# Patient Record
Sex: FEMALE | Race: OTHER
[De-identification: names, ages, dates, MRNs, and addresses within clinical notes are randomized per-mention and may not be internally consistent; named-entity substitution may affect disease eponyms.]

---

## 2020-06-11 ENCOUNTER — HOSPITAL ENCOUNTER (OUTPATIENT)
Dept: HOSPITAL 56 - MW.ED | Age: 30
Setting detail: OBSERVATION
LOS: 1 days | Discharge: HOME | End: 2020-06-12
Attending: SURGERY | Admitting: SURGERY
Payer: COMMERCIAL

## 2020-06-11 DIAGNOSIS — D72.829: ICD-10-CM

## 2020-06-11 DIAGNOSIS — K82.8: Primary | ICD-10-CM

## 2020-06-11 DIAGNOSIS — F17.210: ICD-10-CM

## 2020-06-11 DIAGNOSIS — Z88.5: ICD-10-CM

## 2020-06-11 LAB
BUN SERPL-MCNC: 10 MG/DL (ref 7–18)
CHLORIDE SERPL-SCNC: 100 MMOL/L (ref 98–107)
CO2 SERPL-SCNC: 21.3 MMOL/L (ref 21–32)
GLUCOSE SERPL-MCNC: 100 MG/DL (ref 74–106)
LIPASE SERPL-CCNC: 155 U/L (ref 73–393)
POTASSIUM SERPL-SCNC: 3.4 MMOL/L (ref 3.5–5.1)
SODIUM SERPL-SCNC: 140 MMOL/L (ref 136–145)

## 2020-06-11 PROCEDURE — 84703 CHORIONIC GONADOTROPIN ASSAY: CPT

## 2020-06-11 PROCEDURE — 99285 EMERGENCY DEPT VISIT HI MDM: CPT

## 2020-06-11 PROCEDURE — 76705 ECHO EXAM OF ABDOMEN: CPT

## 2020-06-11 PROCEDURE — 84484 ASSAY OF TROPONIN QUANT: CPT

## 2020-06-11 PROCEDURE — G0378 HOSPITAL OBSERVATION PER HR: HCPCS

## 2020-06-11 PROCEDURE — 96375 TX/PRO/DX INJ NEW DRUG ADDON: CPT

## 2020-06-11 PROCEDURE — 85025 COMPLETE CBC W/AUTO DIFF WBC: CPT

## 2020-06-11 PROCEDURE — 83690 ASSAY OF LIPASE: CPT

## 2020-06-11 PROCEDURE — 96376 TX/PRO/DX INJ SAME DRUG ADON: CPT

## 2020-06-11 PROCEDURE — 96365 THER/PROPH/DIAG IV INF INIT: CPT

## 2020-06-11 PROCEDURE — 71045 X-RAY EXAM CHEST 1 VIEW: CPT

## 2020-06-11 PROCEDURE — 81003 URINALYSIS AUTO W/O SCOPE: CPT

## 2020-06-11 PROCEDURE — 80053 COMPREHEN METABOLIC PANEL: CPT

## 2020-06-11 PROCEDURE — 74177 CT ABD & PELVIS W/CONTRAST: CPT

## 2020-06-11 PROCEDURE — 36415 COLL VENOUS BLD VENIPUNCTURE: CPT

## 2020-06-11 PROCEDURE — 93005 ELECTROCARDIOGRAM TRACING: CPT

## 2020-06-11 NOTE — CR
Chest: Portable view of the chest was obtained.

 

Comparison: No prior chest imaging is available.

 

Heart size and mediastinum are normal.  Lungs are clear with no acute 

parenchymal change.  Bony structures are unremarkable.

 

Impression:

1.  Nothing acute is seen on portable chest x-ray.

 

Diagnostic code #1

 

This report was dictated in MDT
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
## 2020-06-11 NOTE — US
INDICATION:



Fluid around gallbladder on CT. 



COMPARISON:



CT of the abdomen and pelvis from today. 



TECHNIQUE:



Ultrasound examination of the right upper quadrant was performed. 



FINDINGS:



The gallbladder wall is mildly thickened at 5 millimeters. There is no 

definite pericholecystic fluid. 



There is sludge in the gallbladder, without calculi. 



A sonographic Toth sign is present, with pain over the gallbladder during 

ultrasound examination. 



The common bile duct is normal in caliber at 2 mm. 



The pancreatic head and body were examined, and these are normal in 

appearance. 



The abdominal aorta and visualized portions of the inferior vena cava are 

normal in appearance.



The liver shows no sign of mass or contour abnormality, and there is no 

sign of ascites.



The right kidney shows no sign of mass or hydronephrosis. 



A small right pleural effusion is seen, not evident on the CT scan. 



IMPRESSION:



Gallbladder wall mildly thickened with positive sonographic Toth sign, 

findings suggesting acute cholecystitis. 



Sludge in the gallbladder without cholelithiasis. 



No sign of biliary ductal dilatation.



Dictated by Festus Hernandez MD @ Jun 11 2020 11:39PM



Signed by Dr. Festus Hernandez @ Jun 11 2020 11:43PM

## 2020-06-11 NOTE — CT
CT abdomen and pelvis

 

Technique: Multiple axial sections were obtained from above the dome 

of the diaphragm inferiorly through the pubic symphysis.  Intravenous 

contrast was utilized.  No oral contrast was given.

 

Findings: Gallbladder is mildly dilated.  There is some possible fluid

 around the gallbladder.  No calcified gallstones are seen.

 

Findings: Visualized lung bases show nothing acute.  Liver contains no

 focal abnormality.  Spleen appears within normal limits.  Adrenal 

glands show no nodule.  Kidneys show symmetric contrast enhancement 

with no hydronephrosis or mass.  Pancreas shows no discrete 

abnormality.  Aorta shows no aneurysm.  No retroperitoneal adenopathy 

or mesenteric abnormalities are seen.  Appendix is seen which is 

normal in size.  No pelvic mass or adenopathy is seen.  No free fluid 

or inflammatory change is appreciated.

 

Bone window settings were reviewed which shows no acute osseous 

finding.

 

Impression:

1.  Gallbladder slightly distended with possible fluid around the 

gallbladder.  Please correlate if patient has any right upper quadrant

 symptoms to indicate gallbladder ultrasound.

2.  Other portions of the CT exam of the abdomen and pelvis appears 

within normal limits with nothing acute otherwise being seen.

 

Diagnostic code #3

 

This report was dictated in MDT

## 2020-06-12 LAB
BUN SERPL-MCNC: 6 MG/DL (ref 7–18)
CHLORIDE SERPL-SCNC: 106 MMOL/L (ref 98–107)
CO2 SERPL-SCNC: 25.9 MMOL/L (ref 21–32)
GLUCOSE SERPL-MCNC: 93 MG/DL (ref 74–106)
POTASSIUM SERPL-SCNC: 3.7 MMOL/L (ref 3.5–5.1)
SODIUM SERPL-SCNC: 141 MMOL/L (ref 136–145)

## 2020-06-12 NOTE — CONS
DATE OF CONSULTATION:

06/12/2020

 

YOB: 1990

 

PRIMARY CARE PHYSICIAN:

None PCP

 

Consult from Dr. Mercer in the emergency room.

 

CONSULTING QUESTION:

Possible cholecystitis.

 

HISTORY OF PRESENT ILLNESS:

The patient is a 30-year-old obese lady and complained of 48 hours of gradual

onset of epigastric pain.  The pain has radiated to her back through both the

left and the right flanks, and the patient describes the pain as similar to when

she was in labor.  She sought help in the emergency room, and a CAT scan shows

mild gallbladder wall thickening with sludge, no gallstone.  An ultrasound shows

mild wall thickening, no stone, sludge, and definitely no cholecystic fluid.

The patient's lab work shows liver function test within normal limit.  The white

count is 16.5.  Surgery was consulted for further management.  The patient

denied prior episode and denied fever, chill, or nausea and vomiting, and the

patient is complaining about pain.

 

ALLERGIES:

Please refer to nursing note for detail.

 

MEDICATIONS:

Please refer to nursing note for detail.

 

FAMILY HISTORY:

No family history of malignant hyperthermia.

 

PAST SURGICAL HISTORY:

The patient had right thumb reimplantation about 1 year ago secondary to trauma,

tubal ligation, and normal vaginal deliveries x2.

 

PHYSICAL EXAMINATION:

GENERAL:  A very pleasant lady in no acute distress, smiled to the doctor, very

cooperating, and very polite.

HEENT:  Normocephalic and atraumatic.  Sclerae were anicteric.

LUNGS:  Clear to auscultation.

HEART:  Regular rate and rhythm.

ABDOMEN:  Soft and nondistended.  No pulsating tender midline abdominal

structure.  Tenderness in the left lower quadrant, as well as epigastrium and

right upper quadrant.  No rebound tenderness.  Umbilical hernia.

SKIN:  Intact.

 

LABORATORY VALUES UPON CONSULTATION:

White count is 16.5, H and H are 15 and 44, and platelets are 308,000.  Sodium

is 140, potassium is 3.4, BUN is 10, creatinine is 1.4, and glucose is 100.

Total bilirubin is 0.2, AST and ALT of 34 and 37, and alkaline phosphatase is

111. Lipase is 155.  Beta-hCG is negative.  UA:  There is no blood and no signs

or symptoms for urinary tract infection.

 

IMPRESSION:

Tenderness in the right upper quadrant, positive Toth's sign upon getting

ultrasound, and gallbladder sludge, but there is no stone.  All this suggests

symptomatic gallbladder disease. Also, with elevated white count, and the

patient has been hurting for 2 days or so, we will admit the patient for

observation and put on IV fluids, as the patient seems to be a little bit dry,

and give pain medication for pain management and repeat laboratory work in the

morning.  The patient has gallbladder sludge, and there is no standard treatment

on that.  We will monitor the patient's white count and liver function test and

also resuscitation and then reassess 12 hours later.

 

As always, thank you for your kind referral.

 

 

[facility] / [facility]

DD:  06/12/2020 00:52:19

DT:  06/12/2020 04:00:27

Job #:  [id number]

## 2020-06-12 NOTE — PCM.SN.2
- Free Text/Narrative


Note: 


Pt seen, chart reviewed; possible gb disease without gallstones or 

pericholecystatic fluid; admitted for pain/iv abx, followed w serial abd exam 

and lab work; 


[id number]

## 2020-06-22 ENCOUNTER — HOSPITAL ENCOUNTER (OUTPATIENT)
Dept: HOSPITAL 56 - MW.SDS | Age: 30
Discharge: HOME | End: 2020-06-22
Attending: SURGERY
Payer: MEDICAID

## 2020-06-22 DIAGNOSIS — K82.8: ICD-10-CM

## 2020-06-22 DIAGNOSIS — F41.9: ICD-10-CM

## 2020-06-22 DIAGNOSIS — K29.50: Primary | ICD-10-CM

## 2020-06-22 DIAGNOSIS — Z88.5: ICD-10-CM

## 2020-06-22 DIAGNOSIS — Z79.899: ICD-10-CM

## 2020-06-22 DIAGNOSIS — F17.210: ICD-10-CM

## 2020-06-22 DIAGNOSIS — Z11.59: ICD-10-CM

## 2020-06-22 DIAGNOSIS — K20.9: ICD-10-CM

## 2020-06-22 DIAGNOSIS — F32.9: ICD-10-CM

## 2020-06-22 PROCEDURE — 87635 SARS-COV-2 COVID-19 AMP PRB: CPT

## 2020-06-22 PROCEDURE — 43239 EGD BIOPSY SINGLE/MULTIPLE: CPT

## 2020-06-22 PROCEDURE — U0002 COVID-19 LAB TEST NON-CDC: HCPCS

## 2020-06-22 PROCEDURE — 81025 URINE PREGNANCY TEST: CPT

## 2020-06-22 NOTE — PCM.OPNOTE
- General Post-Op/Procedure Note


Date of Surgery/Procedure: 06/22/20


Operative Procedure(s): egd w bx


Findings: 


see [id number]


Pre Op Diagnosis: abd pain


Post-Op Diagnosis: Same


Anesthesia Technique: Moderate Sedation


Primary Surgeon: Mario Alberto Saul


Pathology: 





sent


Complications: None


Condition: Good

## 2020-06-22 NOTE — PCM.POSTAN
POST ANESTHESIA ASSESSMENT





- MENTAL STATUS


Mental Status: Alert, Oriented





- VITAL SIGNS


Vital Signs: 


                                Last Vital Signs











Temp  36.4 C   06/22/20 10:45


 


Pulse  79   06/22/20 12:37


 


Resp  18   06/22/20 12:37


 


BP  117/85   06/22/20 12:37


 


Pulse Ox  97   06/22/20 12:37














- RESPIRATORY


Respiratory Status: Respiratory Rate WNL, Airway Patent, O2 Saturation Stable





- CARDIOVASCULAR


CV Status: Pulse Rate WNL, Blood Pressure Stable





- GASTROINTESTINAL


GI Status: No Symptoms





- PAIN


Pain Score: 0





- POST OP HYDRATION


Hydration Status: Adequate & Stable





- OBSERVATIONS


Free Text/Narrative:: 





No anesthesia problems

## 2020-06-22 NOTE — PCM.PREANE
Preanesthetic Assessment





- Anesthesia/Transfusion/Family Hx


Anesthesia History: Prior Anesthesia Without Reaction


Family History of Anesthesia Reaction: No


Transfusion History: No Prior Transfusion(s)


Intubation History: Unknown





- Review of Systems


General: No Symptoms


Pulmonary: No Symptoms


Cardiovascular: No Symptoms


Gastrointestinal: No Symptoms


Neurological: No Symptoms


Other: Reports: None





- Physical Assessment


Height: 5 ft 4 in


Weight: 79.832 kg


ASA Class: 2


Mental Status: Alert & Oriented x3


Airway Class: Mallampati = 1


Dentition: Reports: Normal Dentition, Broken Tooth/Teeth (x1 upper right), 

Missing Tooth/Teeth (few upper right)


Thyro-Mental Finger Breadths: 3


Mouth Opening Finger Breadths: 3


ROM/Head Extension: Full


Lungs: Clear to Auscultation, Normal Respiratory Effort


Cardiovascular: Regular Rate, Regular Rhythm





- Lab


Values: 





                             Laboratory Last Values











Urine HCG, Qual  NEGATIVE  (NEGATIVE)   06/22/20  10:43    














- Allergies


Allergies/Adverse Reactions: 


                                    Allergies











Allergy/AdvReac Type Severity Reaction Status Date / Time


 


tramadol Allergy  Agitation Verified 06/17/20 09:54














- Blood


Blood Available: No





- Anesthesia Plan


Pre-Op Medication Ordered: None





- Acknowledgements


Anesthesia Type Planned: MAC


Pt an Appropriate Candidate for the Planned Anesthesia: Yes


Alternatives and Risks of Anesthesia Discussed w Pt/Guardian: Yes


Pt/Guardian Understands and Agrees with Anesthesia Plan: Yes





PreAnesthesia Questionnaire


Gastrointestinal History: Reports: GERD


Genitourinary History: Reports: None


OB/GYN History: Reports: Pregnancy


Other OB/BYN History: two children


Musculoskeletal History: Reports: Fracture


Other Musculoskeletal History: crush injury to right hand requiring 8 surgeries 

including muscle grafts


Neurological History: Reports: Seizure


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


Psychiatric History: Reports: Anxiety, Depression





- Infectious Disease History


Infectious Disease History: Reports: Meningitis, Other (See Below)


Other Infectious Disease History: West Nile in 2012





- Past Surgical History


Head Surgeries/Procedures: Reports: None


GI Surgical History: Reports: EGD


Female  Surgical History: Reports: Tubal Ligation


Musculoskeletal Surgical History: Reports: Other (See Below)


Other Musculoskeletal Surgeries/Procedures:: repair of crush injury right hand 

(total of eight surgeries)- Rt. hand is of very "little use"





- SUBSTANCE USE


Smoking Status *Q: Current Every Day Smoker (down to few cigarettes per day)


Tobacco Use Within Last Twelve Months: Cigarettes


Recreational Drug Use History: No





- HOME MEDS


Home Medications: 


                                    Home Meds





Gabapentin [Neurontin] 300 mg PO BID 06/11/20 [History]


Gabapentin [Neurontin] 600 mg PO BEDTIME 06/17/20 [History]











- CURRENT (IN HOUSE) MEDS


Current Meds: 





                               Current Medications





Lactated Ringer's (Ringers, Lactated)  1,000 mls @ 100 mls/hr IV ASDIRECTED AVERY


Lactated Ringer's (Ringers, Lactated)  1,000 mls @ 125 mls/hr IV ASDIRECTED AVERY





Discontinued Medications





Lidocaine (Xylocaine-Mpf 2%) Confirm Administered Dose 5 ml .ROUTE .STK-MED ONE


   Stop: 06/22/20 11:20

## 2020-06-22 NOTE — OR
SURGEON:

Mario Alberto Saul MD

 

DATE OF PROCEDURE:  06/22/2020

 

PREOPERATIVE DIAGNOSIS:

Abdominal pain.

 

POSTOPERATIVE DIAGNOSIS:

Abdominal pain.

 

PROCEDURE PERFORMED:

Esophagogastroduodenoscopy with biopsy.

 

DESCRIPTION OF PROCEDURE:

EGD:  The patient was taken to the endoscopy room, and with the CRNA, Diprivan

was administered.

 

A well-lubricated EGD scope was gently inserted through the oropharynx, down the

esophagus, passing through the gastroesophageal junction, into the stomach.  The

mucosa was examined upon the passage.  Any etiology will be noted.  Once in the

stomach, we continued to advance to the distal antrum, passed through the

pylorus into the second portion of the duodenum.  Again, the mucosa was examined

for any abnormality and etiology.

 

The scope was then retrieved back to the stomach and then retroflexed to look at

the fundus of the stomach.  If a biopsy was indicated, we will biopsy the

antrum, body, and gastroesophageal junction.  The air will be sucked out while

the scope is retrieved to reduce the patient's discomfort.

 

The patient tolerated the procedure well.  There were no intraoperative

complications.  Dr. Saul was present through the whole procedure.

 

Prior to surgery, a time-out had been called, the patient identified, procedure

identified and antibiotic administered.

 

FINDINGS:

1. The patient is easily sedated with CRNA and Diprivan, the patient is

    soundly snoring.

2. Oropharynx and proximal esophagus are free of disease, stricture,

    inflammation, none of those.  Distal esophagus at GE junction at 40 or at

    GE junction at 36 shows moderate amount of salmon-colored change of the

    mucosa, suggests of some acid reflux.  Stomach rugae are normal in

    appearance.  Antrum is a little bit inflamed, consistent with gastritis,

    very mild. Duodenum is grossly normal.  Retroflexed look at the fundus of

    the stomach, there is no hiatal hernia.  Biopsy done at antrum, body, GE

    junction at 40 and sucked out the gas while scope pulling out.  During the

    whole study, there is no food particle or bile observed.  There are a

    couple of dots of blood with no clinical significance.

 

 

TRINIDAD / MODL

DD:  06/22/2020 12:34:14

DT:  06/22/2020 13:44:03

Job #:  [id number]

## 2020-06-22 NOTE — PCM48HPAN
Post Anesthesia Note





- EVALUATION WITHIN 48HRS OF ANESTHETIC


Vital Signs in Normal Range: Yes


Patient Participated in Evaluation: Yes


Respiratory Function Stable: Yes


Airway Patent: Yes


Cardiovascular Function Stable: Yes


Hydration Status Stable: Yes


Pain Control Satisfactory: Yes


Nausea and Vomiting Control Satisfactory: Yes


Mental Status Recovered: Yes


Vital Signs: 


                                Last Vital Signs











Temp  36.4 C   06/22/20 10:45


 


Pulse  79   06/22/20 12:37


 


Resp  18   06/22/20 12:37


 


BP  117/85   06/22/20 12:37


 


Pulse Ox  97   06/22/20 12:37














- COMMENTS/OBSERVATIONS


Free Text/Narrative:: 





No anesthesia problems

## 2020-06-24 ENCOUNTER — HOSPITAL ENCOUNTER (OUTPATIENT)
Dept: HOSPITAL 56 - MW.SDS | Age: 30
Discharge: HOME | End: 2020-06-24
Attending: SURGERY
Payer: MEDICAID

## 2020-06-24 DIAGNOSIS — K80.12: Primary | ICD-10-CM

## 2020-06-24 DIAGNOSIS — E66.9: ICD-10-CM

## 2020-06-24 DIAGNOSIS — F17.210: ICD-10-CM

## 2020-06-24 DIAGNOSIS — Z88.8: ICD-10-CM

## 2020-06-24 PROCEDURE — 47562 LAPAROSCOPIC CHOLECYSTECTOMY: CPT

## 2020-06-24 PROCEDURE — 81025 URINE PREGNANCY TEST: CPT

## 2020-06-24 PROCEDURE — 74176 CT ABD & PELVIS W/O CONTRAST: CPT

## 2020-06-24 RX ADMIN — FENTANYL CITRATE PRN MCG: 50 INJECTION, SOLUTION INTRAMUSCULAR; INTRAVENOUS at 09:48

## 2020-06-24 RX ADMIN — FENTANYL CITRATE PRN MCG: 50 INJECTION, SOLUTION INTRAMUSCULAR; INTRAVENOUS at 09:41

## 2020-06-24 NOTE — PCM.SN.2
- Free Text/Narrative


Note: 





immediately postop, pt was in a crying spell for pain; ct > no obvious 

abnormality; pt admitted to floor for further management; after evening, pt 

remarked pt is completely resolved; and is hungry; and would like to go home; 

asked about pain scale 3X, pt remarked, no pain; home; fu 1 - 2 wks

## 2020-06-24 NOTE — OR
SURGEON:

Mario Alberto Saul MD

 

DATE OF PROCEDURE:  06/24/2020

 

PREOPERATIVE DIAGNOSIS:

Acute on chronic cholecystitis.

 

POSTOPERATIVE DIAGNOSIS:

Acute on chronic cholecystitis.

 

PROCEDURE PERFORMED:

Laparoscopic cholecystectomy.

 

PRIMARY SURGEON:

Mario Alberto Saul MD

 

COMPLICATIONS:

None.

 

INTRAOPERATIVE FINDINGS:

A large gallstone, about 10 mm, stuck in the cystic duct and could not go

anywhere.  Ultrasound suggested there was no stone.  Also, the bile was very

thickened, almost like an old petroleum jelly, very thick and lots of sludge.

Wall was yellow and green, consistent with chronic cholecystitis, but was not

thickened.

 

PROCEDURE NOTE:

The patient was taken to the operating room and placed in the supine position.

After the intubation of general endotracheal anesthesia, the patient's abdomen

was prepped and draped in the usual sterile fashion.  Using [employer], a 12 mm

trocar was placed supraumbilically and then followed with pneumoperitoneum.  A 5

mm trocar was placed in the epigastrium and two 5 mm trocars placed in the right

upper quadrant.  The placement of the last three trocars was done under direct

video supervision.

 

Upon gaining entrance to the abdominal cavity, an extensive examination was then

performed.

 

The gallbladder was located and identified and retracted to the dome of the

liver at the triangle of Calot.  The cystic duct was clipped three more times

and then using the endoscopic clip, was transected with placement of the

endoscopic clip and transection was performed with care, ensuring the posterior

prong of the instruments were clearly visualized prior to exercising the

procedure.

 

The gallbladder was dissected using electrocautery out of the liver bed and then

removed using endoscopic bag through the umbilical site.  The gallbladder was

removed en bloc and there was no bile spillage and this was then followed with

extensive irrigation until the bile was clear from blood and bile.

 

The trocars were then removed under direct video supervision.  The 12 mm

umbilical site was then closed with deep stitches using 0 Vicryl followed with

proximal stitches using 3-0 Vicryl and Dermabond.  The other three trocar sites

were closed with 3-0 Vicryl followed with approximation of skin with Dermabond.

 

The patient was then awakened and extubated and transferred to the recovery room

in hemodynamically stable condition.

 

At the conclusion of the surgery, before closing the abdominal wound, instrument

count and sponge count were done and were correct.

 

The patient tolerated the procedure well and there were no intraoperative

complications.  Dr. Saul was present through the whole procedure.

 

Just before surgery, a timeout was called.  The patient was identified and

procedure identified and procedure started.

 

 

TRINIDAD RAWLS

DD:  06/24/2020 09:33:21

DT:  06/24/2020 13:01:05

Job #:  [id number]

## 2020-06-24 NOTE — CT
CT abdomen and pelvis

 

Technique: Multiple axial sections were obtained from above the dome 

of the diaphragm inferiorly through the pubic symphysis.  Intravenous 

and oral contrast not utilized.

 

Comparison: Previous CT abdomen and pelvis exam of 06/11/20.

 

Findings: Atelectasis is noted within both lung bases.

 

Small amount of free air is noted anterior to the liver.  This 

presumably is from previous surgery.  Liver shows no focal 

abnormality.  Spleen appears normal.  Adrenal glands are unremarkable.

  Surgical clips are seen from recent cholecystectomy.  Kidneys show 

no hydronephrosis.  Pancreas shows no discrete abnormality.

 

Aorta shows no aneurysm.  No retroperitoneal adenopathy or mesenteric 

abnormalities are seen.  No pelvic mass or adenopathy is seen.  Small 

amount of free fluid is seen within the cul-de-sac which is most 

likely physiologic.

 

Appendix is seen and is normal in size.  No free fluid or inflammatory

 change is seen.

 

Impression:

1.  Small amount of free air which is most likely postsurgical in 

etiology.

2.  Bibasilar atelectasis.

3.  Mild amount of fluid within the pelvis believed to be physiologic.

4.  Nothing acute is otherwise seen on noncontrast CT study of the 

abdomen and pelvis.

 

Diagnostic code #2

 

This report was dictated in MDT

## 2020-06-24 NOTE — PCM.PREANE
Preanesthetic Assessment





- Anesthesia/Transfusion/Family Hx


Anesthesia History: Prior Anesthesia Without Reaction


Family History of Anesthesia Reaction: No


Transfusion History: No Prior Transfusion(s)


Intubation History: Unknown





- Review of Systems


General: No Symptoms


Pulmonary: No Symptoms


Cardiovascular: No Symptoms


Gastrointestinal: No Symptoms


Neurological: No Symptoms


Other: Reports: None





- Physical Assessment


Vital Signs: 





                                Last Vital Signs











Temp  36.3 C   06/24/20 07:22


 


Pulse  90   06/24/20 07:22


 


Resp  16   06/24/20 07:22


 


BP  126/75   06/24/20 07:22


 


Pulse Ox  95   06/24/20 07:22











Height: 5 ft 4 in


Weight: 79.832 kg


ASA Class: 2


Mental Status: Alert & Oriented x3


Airway Class: Mallampati = 1


Dentition: Reports: Normal Dentition


Thyro-Mental Finger Breadths: 3


Mouth Opening Finger Breadths: 3


ROM/Head Extension: Full


Lungs: Clear to Auscultation, Normal Respiratory Effort


Cardiovascular: Regular Rate, Regular Rhythm





- Lab


Values: 





                             Laboratory Last Values











Urine HCG, Qual  NEGATIVE  (NEGATIVE)   06/24/20  06:43    














- Allergies


Allergies/Adverse Reactions: 


                                    Allergies











Allergy/AdvReac Type Severity Reaction Status Date / Time


 


tramadol Allergy  Agitation Verified 06/24/20 07:18














- Blood


Blood Available: No





- Anesthesia Plan


Pre-Op Medication Ordered: None





- Acknowledgements


Anesthesia Type Planned: General Anesthesia


Pt an Appropriate Candidate for the Planned Anesthesia: Yes


Alternatives and Risks of Anesthesia Discussed w Pt/Guardian: Yes


Pt/Guardian Understands and Agrees with Anesthesia Plan: Yes





PreAnesthesia Questionnaire


Gastrointestinal History: Reports: GERD, Other (See Below) (gallbladder sludge)


Genitourinary History: Reports: None


OB/GYN History: Reports: Pregnancy


Other OB/BYN History: two children


Musculoskeletal History: Reports: Fracture


Other Musculoskeletal History: crush injury to right hand requiring 8 surgeries 

including muscle grafts


Neurological History: Reports: Seizure


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


Psychiatric History: Reports: Anxiety, Depression


Endocrine/Metabolic History: Reports: Obesity/BMI 30+ (BMI 30.2)





- Infectious Disease History


Infectious Disease History: Reports: Meningitis, Other (See Below)


Other Infectious Disease History: West Nile in 2012





- Past Surgical History


Other Musculoskeletal Surgeries/Procedures:: repair of crush injury right hand 

(total of eight surgeries)- Rt. hand is of very "little use"





- SUBSTANCE USE


Smoking Status *Q: Current Every Day Smoker


Tobacco Use Within Last Twelve Months: Cigarettes


Recreational Drug Use History: No





- HOME MEDS


Home Medications: 


                                    Home Meds





Gabapentin [Neurontin] 300 mg PO BID 06/11/20 [History]


Gabapentin [Neurontin] 600 mg PO BEDTIME 06/17/20 [History]











- CURRENT (IN HOUSE) MEDS


Current Meds: 





                               Current Medications





Lactated Ringer's (Ringers, Lactated)  1,000 mls @ 125 mls/hr IV ASDIRECTED AVERY


   Last Admin: 06/24/20 07:20 Dose:  125 mls/hr


   Documented by: 





Discontinued Medications





Bupivacaine HCl/Epinephrine Bitart (Marcaine 0.25%/Epinephrine 1:200,000) 

Confirm Administered Dose 10 ml .ROUTE .STK-MED ONE


   Stop: 06/24/20 07:15


Bupivacaine HCl/Epinephrine Bitart (Marcaine 0.25%/Epinephrine 1:200,000) 

Confirm Administered Dose 20 ml .ROUTE .STK-MED ONE


   Stop: 06/24/20 07:17


Dexamethasone (Dexamethasone) Confirm Administered Dose 20 mg .ROUTE .STK-MED 

ONE


   Stop: 06/24/20 07:14


Fentanyl (Sublimaze) Confirm Administered Dose 100 mcg .ROUTE .STK-MED ONE


   Stop: 06/24/20 07:22


Hydromorphone HCl (Dilaudid) Confirm Administered Dose 2 mg .ROUTE .STK-MED ONE


   Stop: 06/24/20 07:21


Cefazolin Sodium/Dextrose 2 gm (/ Premix)  50 mls @ 100 mls/hr IV ONETIME ONE


   Stop: 06/24/20 05:29


Iopamidol (Isovue-M 200) Confirm Administered Dose 10 ml ITHECAL .STK-MED ONE


   Stop: 06/24/20 07:15


Lidocaine (Xylocaine-Mpf 2%) Confirm Administered Dose 5 ml .ROUTE .STK-MED ONE


   Stop: 06/24/20 07:11


Ondansetron HCl (Zofran) Confirm Administered Dose 4 mg .ROUTE .STK-MED ONE


   Stop: 06/24/20 07:15


Propofol (Diprivan  20 Ml) Confirm Administered Dose 200 mg .ROUTE .STK-MED ONE


   Stop: 06/24/20 07:11


Propofol (Diprivan  20 Ml) Confirm Administered Dose 200 mg .ROUTE .STK-MED ONE


   Stop: 06/24/20 07:23


Propofol (Diprivan  20 Ml) Confirm Administered Dose 200 mg .ROUTE .STK-MED ONE


   Stop: 06/24/20 07:23


Rocuronium Bromide (Rocuronium Bromide) Confirm Administered Dose 50 mg .ROUTE 

.STK-MED ONE


   Stop: 06/24/20 07:24

## 2020-06-24 NOTE — PCM.OPNOTE
- General Post-Op/Procedure Note


Date of Surgery/Procedure: 06/24/20


Operative Procedure(s): lap jake


Findings: 





a large stone 10mm stucked at cystic duct, wall yellow and green, but not 

thickened; 201632


Pre Op Diagnosis: acute and chronic cholecystitis


Post-Op Diagnosis: Same


Anesthesia Technique: General ET Tube


Primary Surgeon: Mario Alberto Saul


Pathology: 





sent


Complications: None


Condition: Good

## 2020-06-24 NOTE — PCM.POSTAN
POST ANESTHESIA ASSESSMENT





- MENTAL STATUS


Mental Status: Alert, Oriented





- VITAL SIGNS


Vital Signs: 


                                Last Vital Signs











Temp  36.4 C   06/24/20 09:27


 


Pulse  61   06/24/20 10:33


 


Resp  13   06/24/20 10:33


 


BP  100/61   06/24/20 10:33


 


Pulse Ox  96   06/24/20 10:33














- RESPIRATORY


Respiratory Status: Respiratory Rate WNL, Airway Patent, O2 Saturation Stable





- CARDIOVASCULAR


CV Status: Pulse Rate WNL, Blood Pressure Stable





- GASTROINTESTINAL


GI Status: No Symptoms





- PAIN


Pain Score: 4





- POST OP HYDRATION


Hydration Status: Adequate & Stable





- OBSERVATIONS


Free Text/Narrative:: 





No anesthesia problems

## 2020-06-25 ENCOUNTER — HOSPITAL ENCOUNTER (EMERGENCY)
Dept: HOSPITAL 56 - MW.ED | Age: 30
Discharge: HOME | End: 2020-06-25
Payer: MEDICAID

## 2020-06-25 DIAGNOSIS — E66.9: ICD-10-CM

## 2020-06-25 DIAGNOSIS — Z88.5: ICD-10-CM

## 2020-06-25 DIAGNOSIS — G89.18: Primary | ICD-10-CM

## 2020-06-25 LAB
BUN SERPL-MCNC: 9 MG/DL (ref 7–18)
CHLORIDE SERPL-SCNC: 107 MMOL/L (ref 98–107)
CO2 SERPL-SCNC: 23.9 MMOL/L (ref 21–32)
GLUCOSE SERPL-MCNC: 80 MG/DL (ref 74–106)
POTASSIUM SERPL-SCNC: 3.2 MMOL/L (ref 3.5–5.1)
SODIUM SERPL-SCNC: 145 MMOL/L (ref 136–145)

## 2020-06-25 PROCEDURE — 96374 THER/PROPH/DIAG INJ IV PUSH: CPT

## 2020-06-25 PROCEDURE — 85025 COMPLETE CBC W/AUTO DIFF WBC: CPT

## 2020-06-25 PROCEDURE — 99283 EMERGENCY DEPT VISIT LOW MDM: CPT

## 2020-06-25 PROCEDURE — 96375 TX/PRO/DX INJ NEW DRUG ADDON: CPT

## 2020-06-25 PROCEDURE — 80048 BASIC METABOLIC PNL TOTAL CA: CPT

## 2020-06-25 PROCEDURE — 96372 THER/PROPH/DIAG INJ SC/IM: CPT

## 2020-06-25 PROCEDURE — 36415 COLL VENOUS BLD VENIPUNCTURE: CPT

## 2020-06-25 NOTE — EDM.PDOC
ED HPI GENERAL MEDICAL PROBLEM





- General


Chief Complaint: Abdominal Pain


Stated Complaint: PAIN FROM GALLBLADDER REMOVAL


Time Seen by Provider: 06/25/20 18:24


Source of Information: Reports: Patient


History Limitations: Reports: No Limitations





- History of Present Illness


INITIAL COMMENTS - FREE TEXT/NARRATIVE: 


HISTORY AND PHYSICAL:





History of present illness:


Patient is a 30-year-old female who presents to the emergency room with 

complaints of postoperative pain.  She had gallbladder removed yesterday by Dr. Saul.  When she woke up from anesthesia she said she had this pain that she is 

having now.  She was discharged to home and instructed to  her prescribed

pain medication.  States that the pain medication will not be available until 

tomorrow to be picked up.  She has not taken anything over-the-counter or 

prescribed for her pain but rather decided to drink alcohol to see if this would

help alleviate her discomfort.  Patient denies any fever, chills, headache, 

change in vision, syncope or near syncope. Denies any chest pain, back pain, 

shortness of breath or cough. Denies any nausea, vomiting, diarrhea, 

constipation or dysuria. Has not noted any blood in urine or stool. Patient has 

been eating and drinking appropriately.





Review of systems: 


As per history of present illness and below otherwise all systems reviewed and 

negative.





Past medical history: 


As per history of present illness and as reviewed below otherwise 

noncontributory.





Surgical history: 


As per history of present illness and as reviewed below otherwise 

noncontributory.





Social history: 


See social history for further information





Family history: 


As per history of present illness and as reviewed below otherwise 

noncontributory.





Physical exam:


General: Well-developed and well-nourished 30-year-old female.  Alert and 

oriented.  Nontoxic but anxious appearing -in no acute distress.


HEENT: Atraumatic, normocephalic, pupils equal and reactive bilaterally, 

negative for conjunctival pallor or scleral icterus, mucous membranes moist, 

trachea midline. No drooling or trismus noted. No meningeal signs. No hot potato

voice noted. 


Lungs: Clear to auscultation, breath sounds equal bilaterally, chest nontender.


Heart: S1S2, regular rate and rhythm without overt murmur


Abdomen: Soft, nondistended, generalized tenderness in all 4 quadrants.  Stab 

site noted to umbilicus and right upper quadrant appear free of infection, no 

drainage noted or surrounding erythema.  Negative for masses or 

hepatosplenomegaly. Negative for costovertebral tenderness.


Pelvis: Stable nontender.


Skin: Intact, warm, dry. No lesions or rashes noted.


Extremities: Atraumatic, moves all extremities per self without difficulty or 

deficits, negative for cords or calf pain. Neurovascular unremarkable.


Neuro: Awake, alert, oriented. Cranial nerves II through XII unremarkable. 

Cerebellum unremarkable. Motor and sensory unremarkable throughout. Exam 

nonfocal.





Notes:


Patient received the IV Dilaudid, she continues to have pain and appears very 

anxious.  We will give 2.5 Haldol IM. 





Lab work is unremarkable.  Now that the patient has calm down I have reassessed 

her and she appears much more comfortable.  Will discharge her to home.  We 

discussed signs and symptoms that would prompt her to return to the emergency 

room.  She does have a follow-up appointment with the surgeon next week.  

Supportive care measures were reviewed and discussed. Voices understanding and 

is agreeable to plan of care. Denies any further questions or concerns at this 

time.





Diagnostics:


CBC, BMP





Therapeutics:


IV fluid, Dilaudid, Zofran





Prescription:


None





Impression: 


Postoperative pain





Plan:


1.  [place] diet, advance as tolerated.  Please increase your fluids to prevent 

dehydration.  Gentle activities, please do not overdo it with any heavy lifting 

etc..


2.  You can alternate Tylenol and/or ibuprofen as needed for pain management.  

Please  your oxycodone that you have been prescribed by the surgeon.  Sherif

e this medication as directed and as needed for moderate to severe pain.  Please

be careful as this medication is a narcotic and will cause drowsiness.  Do not 

take it while driving or needing to be functioning outside of the house.


3.  Keep your follow-up appointment with Dr. Meyer, should anything arise between 

then and now you should call to see if your appointment can be expedited.


4.  Should your symptoms return, worsen or new symptoms develop please return to

the emergency room as we discussed.





Definitive disposition and diagnosis as appropriate pending reevaluation and 

review of above.





  ** Abdominal Pain


Pain Score (Numeric/FACES): 10





- Related Data


                                    Allergies











Allergy/AdvReac Type Severity Reaction Status Date / Time


 


tramadol Allergy  Agitation Verified 06/25/20 18:23











Home Meds: 


                                    Home Meds





. [No Known Home Meds]  06/25/20 [History]











Past Medical History


Gastrointestinal History: Reports: GERD, Other (See Below)


Genitourinary History: Reports: None


OB/GYN History: Reports: Pregnancy


Other OB/GYN History: two children


Musculoskeletal History: Reports: Fracture


Other Musculoskeletal History: crush injury to right hand requiring 8 surgeries 

including muscle grafts


Neurological History: Reports: Seizure


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


Psychiatric History: Reports: Anxiety, Depression


Endocrine/Metabolic History: Reports: Obesity/BMI 30+





- Infectious Disease History


Infectious Disease History: Reports: Meningitis, Other (See Below)


Other Infectious Disease History: West Nile in 2012





- Past Surgical History


Head Surgeries/Procedures: Reports: None


GI Surgical History: Reports: EGD


Female  Surgical History: Reports: Tubal Ligation


Musculoskeletal Surgical History: Reports: Other (See Below)


Other Musculoskeletal Surgeries/Procedures:: repair of crush injury right hand 

(total of eight surgeries)- Rt. hand is of very "little use"





Social & Family History





- Family History


Cardiac: Reports: Angina, High Cholesterol, Hypertension, MI


OBGYN: Reports: Pregnancy


Musculoskeletal: Reports: Arthritis, Back pain, Chronic


Psychiatric: Reports: ADD, ADHD, Anxiety, Autism, Bipolar, Depression, Emotional

 Problems, OCD, Panic Attack


Endocrine/Metabolic: Reports: Diabetes, type II


Oncologic: Reports: Breast, Colon, Ovarian





- Caffeine Use


Caffeine Use: Reports: Coffee, Soda, Tea





ED ROS GENERAL





- Review of Systems


Review Of Systems: Comprehensive ROS is negative, except as noted in HPI.





ED EXAM, GI/ABD





- Physical Exam


Exam: See Below (See dictation)





Course





- Vital Signs


Last Recorded V/S: 


                                Last Vital Signs











Temp  97.6 F   06/25/20 19:13


 


Pulse  74   06/25/20 19:59


 


Resp  22 H  06/25/20 19:13


 


BP  124/76   06/25/20 19:59


 


Pulse Ox  93 L  06/25/20 19:59














- Orders/Labs/Meds


Labs: 


                                Laboratory Tests











  06/25/20 06/25/20 Range/Units





  18:50 18:50 


 


WBC  10.64   (4.0-11.0)  K/uL


 


RBC  4.06 L   (4.30-5.90)  M/uL


 


Hgb  12.5   (12.0-16.0)  g/dL


 


Hct  38.1   (36.0-46.0)  %


 


MCV  93.8   (80.0-98.0)  fL


 


MCH  30.8   (27.0-32.0)  pg


 


MCHC  32.8   (31.0-37.0)  g/dL


 


RDW Std Deviation  46.0   (28.0-62.0)  fl


 


RDW Coeff of Carrie  13   (11.0-15.0)  %


 


Plt Count  267   (150-400)  K/uL


 


MPV  10.10   (7.40-12.00)  fL


 


Neut % (Auto)  61.4   (48.0-80.0)  %


 


Lymph % (Auto)  30.0   (16.0-40.0)  %


 


Mono % (Auto)  8.3   (0.0-15.0)  %


 


Eos % (Auto)  0.1   (0.0-7.0)  %


 


Baso % (Auto)  0.2   (0.0-1.5)  %


 


Neut # (Auto)  6.5 H   (1.4-5.7)  K/uL


 


Lymph # (Auto)  3.2 H   (0.6-2.4)  K/uL


 


Mono # (Auto)  0.9 H   (0.0-0.8)  K/uL


 


Eos # (Auto)  0.0   (0.0-0.7)  K/uL


 


Baso # (Auto)  0.0   (0.0-0.1)  K/uL


 


Nucleated RBC %  0.0   /100WBC


 


Nucleated RBCs #  0   K/uL


 


Sodium   145  (136-145)  mmol/L


 


Potassium   3.2 L  (3.5-5.1)  mmol/L


 


Chloride   107  ()  mmol/L


 


Carbon Dioxide   23.9  (21.0-32.0)  mmol/L


 


BUN   9  (7.0-18.0)  mg/dL


 


Creatinine   0.8  (0.6-1.0)  mg/dL


 


Est Cr Clr Drug Dosing   92.53  mL/min


 


Estimated GFR (MDRD)   > 60.0  ml/min


 


Glucose   80  ()  mg/dL


 


Calcium   8.4 L  (8.5-10.1)  mg/dL











Meds: 


Medications














Discontinued Medications














Generic Name Dose Route Start Last Admin





  Trade Name Freq  PRN Reason Stop Dose Admin


 


Haloperidol Lactate  2.5 mg  06/25/20 19:36  06/25/20 19:42





  Haldol  IM  06/25/20 19:37  2.5 mg





  ONETIME ONE   Administration


 


Haloperidol Lactate  Confirm  06/25/20 19:37  06/25/20 19:42





  Haldol  Administered  06/25/20 19:38  Not Given





  Dose  





  5 mg  





  .ROUTE  





  .STK-MED ONE  


 


Hydromorphone HCl  1 mg  06/25/20 18:27  06/25/20 19:06





  Dilaudid  IVPUSH  06/25/20 18:28  1 mg





  ONETIME ONE   Administration


 


Sodium Chloride  1,000 mls @ 999 mls/hr  06/25/20 18:28  06/25/20 19:08





  Normal Saline  IV  06/25/20 19:28  999 mls/hr





  .Bolus ONE   Administration


 


Lorazepam  0.5 mg  06/25/20 19:27  06/25/20 19:45





  Ativan  IVPUSH  06/25/20 19:28  Not Given





  ONETIME ONE  


 


Ondansetron HCl  4 mg  06/25/20 18:27  06/25/20 19:06





  Zofran  IVPUSH  06/25/20 18:28  4 mg





  ONETIME ONE   Administration














Departure





- Departure


Time of Disposition: 20:17


Disposition: Home, Self-Care 01


Clinical Impression: 


 Postoperative pain








- Discharge Information


Instructions:  Pain Medicine Instructions, Easy-to-Read


Referrals: 


PCP,None [Primary Care Provider] - 


Forms:  ED Department Discharge


Additional Instructions: 


The following information is given to patients seen in the emergency department 

who are being discharged to home. This information is to outline your options 

for follow-up care. We provide all patients seen in our emergency department 

with a follow-up referral.





The need for follow-up, as well as the timing and circumstances, are variable 

depending upon the specifics of your emergency department visit.





If you don't have a primary care physician on staff, we will provide you with a 

referral. We always advise you to contact your personal physician following an 

emergency department visit to inform them of the circumstance of the visit and 

for follow-up with them and/or the need for any referrals to a consulting 

specialist.





The emergency department will also refer you to a specialist when appropriate. 

This referral assures that you have the opportunity for follow-up care with a 

specialist. All of these measure are taken in an effort to provide you with 

optimal care, which includes your follow-up.





Under all circumstances we always encourage you to contact your private 

physician who remains a resource for coordinating your care. When calling for 

follow-up care, please make the office aware that this follow-up is from your 

recent emergency room visit. If for any reason you are refused follow-up, please

contact the [facility] Emergency Department

at [phone] and asked to speak to the emergency department charge nurse.





[facility]


Primary Care


[address] [place] [address]


Phone: [phone]


Fax: [phone]





[facility]


[address] [place] [address]


Phone: [phone]


Fax: [phone]





1.  [place] diet, advance as tolerated.  Please increase your fluids to prevent 

dehydration.  Gentle activities, please do not overdo it with any heavy lifting 

etc..


2.  You can alternate Tylenol and/or ibuprofen as needed for pain management.  

Please  your oxycodone that you have been prescribed by the surgeon.  

Take this medication as directed and as needed for moderate to severe pain.  

Please be careful as this medication is a narcotic and will cause drowsiness.  

Do not take it while driving or needing to be functioning outside of the house.


3.  Keep your follow-up appointment with Dr. Meyer, should anything arise between 

then and now you should call to see if your appointment can be expedited.


4.  Should your symptoms return, worsen or new symptoms develop please return to

the emergency room as we discussed.





Sepsis Event Note (ED)





- Evaluation


Sepsis Screening Result: No Definite Risk





- Focused Exam


Vital Signs: 


                                   Vital Signs











  Temp Pulse Resp BP Pulse Ox


 


 06/25/20 19:59   74   124/76  93 L


 


 06/25/20 19:13  97.6 F  85  22 H  132/77  98


 


 06/25/20 18:24  97.3 F  87  24 H   98